# Patient Record
Sex: MALE | URBAN - METROPOLITAN AREA
[De-identification: names, ages, dates, MRNs, and addresses within clinical notes are randomized per-mention and may not be internally consistent; named-entity substitution may affect disease eponyms.]

---

## 2017-11-15 ENCOUNTER — TRANSFERRED RECORDS (OUTPATIENT)
Dept: HEALTH INFORMATION MANAGEMENT | Facility: CLINIC | Age: 82
End: 2017-11-15

## 2017-11-30 ENCOUNTER — OFFICE VISIT (OUTPATIENT)
Dept: OPHTHALMOLOGY | Facility: CLINIC | Age: 82
End: 2017-11-30
Attending: OPHTHALMOLOGY
Payer: MEDICARE

## 2017-11-30 DIAGNOSIS — H18.893 CORNEAL THINNING OF BOTH EYES: Primary | ICD-10-CM

## 2017-11-30 PROCEDURE — 92025 CPTRIZED CORNEAL TOPOGRAPHY: CPT | Mod: ZF | Performed by: OPHTHALMOLOGY

## 2017-11-30 PROCEDURE — 99215 OFFICE O/P EST HI 40 MIN: CPT | Mod: ZF

## 2017-11-30 RX ORDER — ASPIRIN 81 MG/1
TABLET ORAL
COMMUNITY
Start: 2008-01-28

## 2017-11-30 RX ORDER — NEOMYCIN SULFATE, POLYMYXIN B SULFATE, AND DEXAMETHASONE 3.5; 10000; 1 MG/G; [USP'U]/G; MG/G
1 OINTMENT OPHTHALMIC AT BEDTIME
COMMUNITY
Start: 2017-05-09

## 2017-11-30 RX ORDER — MINOCYCLINE HYDROCHLORIDE 100 MG/1
1 CAPSULE ORAL DAILY
COMMUNITY
Start: 2017-10-25

## 2017-11-30 RX ORDER — CYCLOSPORINE 0.5 MG/ML
1 EMULSION OPHTHALMIC 2 TIMES DAILY
COMMUNITY
Start: 2017-05-30

## 2017-11-30 RX ORDER — DOXYCYCLINE HYCLATE 100 MG
TABLET ORAL
COMMUNITY
Start: 2017-04-21

## 2017-11-30 RX ORDER — PREDNISOLONE ACETATE 10 MG/ML
1 SUSPENSION/ DROPS OPHTHALMIC 2 TIMES DAILY
COMMUNITY

## 2017-11-30 RX ORDER — PROPRANOLOL HYDROCHLORIDE 40 MG/1
1 TABLET ORAL 2 TIMES DAILY
COMMUNITY
Start: 2017-09-12

## 2017-11-30 ASSESSMENT — REFRACTION_WEARINGRX
OS_AXIS: 088
SPECS_TYPE: PAL
OD_SPHERE: PLANO
OS_SPHERE: -0.50
OD_ADD: +2.75
OS_ADD: +2.75
OD_CYLINDER: +1.00
OD_AXIS: 093
OS_CYLINDER: +0.50

## 2017-11-30 ASSESSMENT — CONF VISUAL FIELD
OD_NORMAL: 1
OS_NORMAL: 1

## 2017-11-30 ASSESSMENT — VISUAL ACUITY
CORRECTION_TYPE: GLASSES
OS_PH_CC: 20/50
METHOD: SNELLEN - LINEAR
OD_CC: 20/80
OD_PH_CC: 20/50-2
OS_CC+: +1
OD_CC+: -1
OS_CC: 20/60

## 2017-11-30 ASSESSMENT — TONOMETRY
IOP_METHOD: ICARE
OD_IOP_MMHG: 13
OS_IOP_MMHG: 17

## 2017-11-30 ASSESSMENT — ENCOUNTER SYMPTOMS: JOINT SWELLING: 1

## 2017-11-30 NOTE — NURSING NOTE
Chief Complaints and History of Present Illnesses   Patient presents with     Consult For     corneoscleral limbal thinning OU - referred by Dr. Camarena in [facility]    Affected eye(s):  Both   Symptoms:     Decreased vision (Comment: both eyes)   No floaters   No flashes   Redness (Comment: both eyes)   Foreign body sensation (Comment: irritated OU)   Photophobia (Comment: both eyes)      Duration:  3 months   Frequency:  Constant       Do you have eye pain now?:  No      Comments:  Pt here for consult of corneoscleral limbal thinning OU  Pt states he has noted changes in the vision over the past few months    Ocular meds:  Prednisolone acetate BID, both eyes  Julian/poly/dex ointment QHS, both eyes  Systane ultra PRN, both eyes  Restasis BID, both eyes    Florinda MENDOZA 2:36 PM November 30, 2017

## 2017-11-30 NOTE — MR AVS SNAPSHOT
After Visit Summary   2017    Mayank Neely    MRN: [id number]           Patient Information     Date Of Birth          1935        Visit Information        Provider Department      2017 2:00 PM [facility]        Today's Diagnoses     Corneal thinning of both eyes    -  1       Follow-ups after your visit        Follow-up notes from your care team     Return in about 4 weeks (around 2017).      Who to contact     Please call your clinic at [phone] to:    Ask questions about your health    Make or cancel appointments    Discuss your medicines    Learn about your test results    Speak to your doctor   If you have compliments or concerns about an experience at your clinic, or if you wish to file a complaint, please contact [facility] Patient Relations at [phone] or email us at [email or web address]         Additional Information About Your Visit        MyChart Information     Perzot is an electronic gateway that provides easy, online access to your medical records. With Cloneless, you can request a clinic appointment, read your test results, renew a prescription or communicate with your care team.     To sign up for Perzot visit the website at [email or web address]   You will be asked to enter the access code listed below, as well as some personal information. Please follow the directions to create your username and password.     Your access code is: [email or web address]  Expires: 2018  6:31 AM     Your access code will  in 90 days. If you need help or a new code, please contact your [facility] or call [phone] for assistance.        Care EveryWhere ID     This is your Care EveryWhere ID. This could be used by other organizations to access your [facility] medical records  [id number]         Blood Pressure from Last 3 Encounters:   No data found for BP    Weight from  Last 3 Encounters:   No data found for Wt              We Performed the Following     Corneal Topography OU (both eyes)          Today's Medication Changes          These changes are accurate as of: 11/30/17 11:59 PM.  If you have any questions, ask your nurse or doctor.               These medicines have changed or have updated prescriptions.        Dose/Directions    * prednisoLONE acetate 1 % ophthalmic susp   Commonly known as:  PRED FORTE   This may have changed:  Another medication with the same name was added. Make sure you understand how and when to take each.   Changed by:  Geremias Streeter MD        Dose:  1 drop   Place 1 drop into both eyes 2 times daily   Refills:  0       * prednisolone 0.25% and hyaluronate in balanced salt Susp compounded ophthalmic suspension   This may have changed:  You were already taking a medication with the same name, and this prescription was added. Make sure you understand how and when to take each.   Used for:  Corneal thinning of both eyes   Changed by:  Geremias Streeter MD        Dose:  1 drop   Place 1 drop into both eyes 6 times daily   Quantity:  1 Bottle   Refills:  3       * Notice:  This list has 2 medication(s) that are the same as other medications prescribed for you. Read the directions carefully, and ask your doctor or other care provider to review them with you.         Where to get your medicines      These medications were sent to [facility] - [address], [facility] [address]     Phone:  [phone]     prednisolone 0.25% and hyaluronate in balanced salt Susp compounded ophthalmic suspension                Primary Care Provider Fax #    Physician No Ref-Primary [phone]       No address on file        Equal Access to Services     [facility] AH: Dania Kolb, wadenise luelva, qaybjoan kaalcarlos martinez . So [facility]  [phone].    ATENCIÓN: Si filiberto kelsey, tiene a atkinson disposición servicios gratuitos de asistencia lingüística. [place] hunt [phone].    We comply with applicable federal civil rights laws and [place] laws. We do not discriminate on the basis of race, color, national origin, age, disability, sex, sexual orientation, or gender identity.            Thank you!     Thank you for choosing [facility]  for your care. Our goal is always to provide you with excellent care. Hearing back from our patients is one way we can continue to improve our services. Please take a few minutes to complete the written survey that you may receive in the mail after your visit with us. Thank you!             Your Updated Medication List - Protect others around you: Learn how to safely use, store and throw away your medicines at www.disposemymeds.org.          This list is accurate as of: 11/30/17 11:59 PM.  Always use your most recent med list.                   Brand Name Dispense Instructions for use Diagnosis    aspirin EC 81 MG EC tablet           doxycycline 100 MG tablet    VIBRA-TABS     TAKE ONE TABLET BY MOUTH TWICE DAILY for two weeks and then TAKE ONE TABLET BY MOUTH ONCE EVERY DAY        HYDROCHLOROTHIAZIDE PO           minocycline 100 MG capsule    MINOCIN/DYNACIN     Take 1 capsule by mouth daily        neomycin-polymyxin-dexamethasone [id number] Oint ophthalmic ointment    MAXITROL     Place 1 Application into both eyes At Bedtime        polyethylene glycol 0.4%- propylene glycol 0.3% 0.4-0.3 % Soln ophthalmic solution    SYSTANE ULTRA     Place 1 drop into both eyes as needed        * prednisoLONE acetate 1 % ophthalmic susp    PRED FORTE     Place 1 drop into both eyes 2 times daily        * prednisolone 0.25% and hyaluronate in balanced salt Susp compounded ophthalmic suspension     1 Bottle    Place 1 drop into both eyes 6 times daily    Corneal thinning of both eyes       PREDNISONE PO      Take 20 mg by mouth  daily        propranolol 40 MG tablet    INDERAL     Take 1 tablet by mouth 2 times daily        RESTASIS 0.05 % ophthalmic emulsion   Generic drug:  cycloSPORINE      Place 1 drop into both eyes 2 times daily        * Notice:  This list has 2 medication(s) that are the same as other medications prescribed for you. Read the directions carefully, and ask your doctor or other care provider to review them with you.

## 2017-11-30 NOTE — PROGRESS NOTES
"CC: referral from Dr. Camarena for corneal melt both eyes    HPI: Mayank Neely is a 82 year old male who presents for evaluation of possible corneal melting in both eyes. He reports an episode of pain in both eyes which prompted evaluation with an eye doctor at [facility] in 3/2017. He reports being diagnosed with bilateral corneal ulcers (unknown cause per patient). He reports being started on antibiotic eyedrops. Over time, both eyes have improved, but they remain sore and light sensitive. The vision is \"bad\" in both eyes - he reports VA of 20/15 in both eyes following cataract surgery about 4yrs ago.     He reports feeling that both eyes were intermittently sore last winter.    His last visit with Dr. Camarena was 11/15/17. He was concerned about bilateral corneal melt and started the pt on oral prednisone. Patient notes mild improvement in symptoms since then. Dr. Camarena also requested cornea evaluation and rheumatology evaluation.     PCP - Dr. Barrett - [facility]    PMH:    HTN on medications  Lupus (confirmed by biopsy with dermatology) - uses topical cream as needed but no oral treatment  Leukemia - per daughter it is not progressive - monitoring  S/p hernia surgery    POHx:  CE/IOL OD - 10yrs ago  CE/IOL OS - 5yrs ago  Wears glasses, no CL use  Blepharitis     punctal cautery LL right eye  PP in place LL left eye     Current Eye Medications:     Prednisolone acetate BID, both eyes  Julian/poly/dex ointment QHS, both eyes  Systane ultra PRN, both eyes  Restasis BID, both eyes  Minocycline 100mg daily x 1 month  Prednisone 60 x5d/40 x5d/20 daily (current dose)  Warm compresses daily    SH:    Never smoker    Assessment & Plan   Mayank Neely is a 82 year old male with the following diagnoses:     Corneal stromal thinning, both eyes:    -irreg epi both eyes but no epi defect currently  -history of SLE not on medications  -+facial redness and blepharitis   -continue oral " prednisone and minocycline    - discontinue all preserve containing drops  - start pred healon 4-6x/day  - discontinue nighttime maxitrol salomón, do Refresh PM      -consider serum tears if not improving    Blepharitis, both eyes:  -continue warm compresses  -continue minocycline       Michelle Sanchez MD   PGY-3 Ophthalmology      ~~~~~~~~~~~~~~~~~~~~~~~~~~~~~~~~~~~~~~~~~~~~~~~~~~~~~~~~~~~~~~~~    Complete documentation of historical and exam elements from today's encounter can be found in the full encounter summary report (not reduplicated in this progress note). I personally obtained the chief complaint(s) and history of present illness.  I confirmed and edited as necessary the review of systems, past medical/surgical history, family history, social history, and examination findings as documented by others.  I examined the patient myself, and I personally reviewed the relevant tests, images, and reports as documented above. I formulated and edited as necessary the assessment and plan and discussed the findings and management plan with the patient and family.     I personally interpreted the diagnostic / imaging study and have edited the interpretation as needed.    Geremias Streeter MD, MA  Director, Cornea & Anterior Segment  [facility] Department of Ophthalmology & Visual Neuroscience

## 2017-12-08 DIAGNOSIS — H18.893 CORNEAL THINNING OF BOTH EYES: ICD-10-CM

## 2018-04-06 DIAGNOSIS — H18.893 CORNEAL THINNING OF BOTH EYES: ICD-10-CM

## 2018-04-06 NOTE — TELEPHONE ENCOUNTER
prednisolone 0.25% and hyaluronate     Last Written Prescription Date:  12/8/17  Last Fill Quantity: 1 BOT,   # refills: 5  Last Office Visit : 11/30/17  Future Office visit:  NONE    Routing refill request to provider for review/approval because:  11/30/17  NOTE : Return in about 4 weeks (around 12/28/2017).   NO RTC APPT.     CONT/RF MED?   Scheduling has been notified to contact the pt for appointment.